# Patient Record
Sex: MALE | Race: WHITE | NOT HISPANIC OR LATINO | Employment: STUDENT | URBAN - METROPOLITAN AREA
[De-identification: names, ages, dates, MRNs, and addresses within clinical notes are randomized per-mention and may not be internally consistent; named-entity substitution may affect disease eponyms.]

---

## 2022-03-05 ENCOUNTER — HOSPITAL ENCOUNTER (EMERGENCY)
Facility: HOSPITAL | Age: 22
Discharge: HOME | End: 2022-03-05
Attending: EMERGENCY MEDICINE | Admitting: EMERGENCY MEDICINE

## 2022-03-05 VITALS
TEMPERATURE: 98.2 F | OXYGEN SATURATION: 97 % | HEART RATE: 88 BPM | DIASTOLIC BLOOD PRESSURE: 78 MMHG | SYSTOLIC BLOOD PRESSURE: 120 MMHG | RESPIRATION RATE: 18 BRPM

## 2022-03-05 DIAGNOSIS — S61.012A THUMB LACERATION, LEFT, INITIAL ENCOUNTER: Primary | ICD-10-CM

## 2022-03-05 PROCEDURE — 99282 EMERGENCY DEPT VISIT SF MDM: CPT | Performed by: EMERGENCY MEDICINE

## 2022-03-05 PROCEDURE — 99282 EMERGENCY DEPT VISIT SF MDM: CPT

## 2022-03-05 PROCEDURE — 12002 RPR S/N/AX/GEN/TRNK2.6-7.5CM: CPT | Performed by: EMERGENCY MEDICINE

## 2022-03-05 RX ORDER — LIDOCAINE HYDROCHLORIDE 10 MG/ML
10 INJECTION, SOLUTION EPIDURAL; INFILTRATION; INTRACAUDAL; PERINEURAL ONCE
Status: COMPLETED | OUTPATIENT
Start: 2022-03-05 | End: 2022-03-05

## 2022-03-05 RX ORDER — ROPIVACAINE HYDROCHLORIDE 5 MG/ML
15 INJECTION, SOLUTION EPIDURAL; INFILTRATION; PERINEURAL ONCE
Status: COMPLETED | OUTPATIENT
Start: 2022-03-05 | End: 2022-03-05

## 2022-03-05 RX ORDER — LIDOCAINE HYDROCHLORIDE 10 MG/ML
10 INJECTION, SOLUTION EPIDURAL; INFILTRATION; INTRACAUDAL; PERINEURAL ONCE
Status: DISCONTINUED | OUTPATIENT
Start: 2022-03-05 | End: 2022-03-05

## 2022-03-05 RX ADMIN — LIDOCAINE HYDROCHLORIDE 10 ML: 10 INJECTION, SOLUTION EPIDURAL; INFILTRATION; INTRACAUDAL; PERINEURAL at 03:56

## 2022-03-05 RX ADMIN — ROPIVACAINE HYDROCHLORIDE 15 ML: 5 INJECTION, SOLUTION EPIDURAL; INFILTRATION; PERINEURAL at 03:56

## 2022-03-05 NOTE — ED PROVIDER NOTES
History  Chief Complaint   Patient presents with    Finger Laceration     Picked up glass about two hours ago and cut his left thumb on the glass  Deepika Akers is a 25year-old young man presenting with a left thumb laceration  Cut himself trying to clean up glass  No foreign body sensation  No numbness or weakness in left thumb  He is left hand dominant  No other injuries from tonight, no other complaints at this time  Tetanus up to date  He had several alcoholic beverages tonight  None       History reviewed  No pertinent past medical history  Past Surgical History:   Procedure Laterality Date    MASTECTOMY FOR GYNECOMASTIA         History reviewed  No pertinent family history  I have reviewed and agree with the history as documented  E-Cigarette/Vaping     E-Cigarette/Vaping Substances     Social History     Tobacco Use    Smoking status: Never Smoker    Smokeless tobacco: Never Used   Substance Use Topics    Alcohol use: Yes     Alcohol/week: 9 0 standard drinks     Types: 9 Shots of liquor per week    Drug use: Not on file        Review of Systems   All other systems reviewed and are negative  Physical Exam  ED Triage Vitals   Temp Pulse Resp BP SpO2   -- -- -- -- --      Temp src Heart Rate Source Patient Position - Orthostatic VS BP Location FiO2 (%)   -- -- -- -- --      Pain Score       --             Orthostatic Vital Signs  There were no vitals filed for this visit  Physical Exam  Vitals and nursing note reviewed  Constitutional:       General: He is not in acute distress  Appearance: He is not ill-appearing  Comments: Appears intoxicated  HENT:      Head: Normocephalic and atraumatic  Right Ear: External ear normal       Left Ear: External ear normal       Nose: Nose normal    Eyes:      Conjunctiva/sclera: Conjunctivae normal    Cardiovascular:      Rate and Rhythm: Normal rate and regular rhythm     Pulmonary:      Effort: Pulmonary effort is normal    Abdominal:      General: There is no distension  Musculoskeletal:      Comments: ROM left thumb normal     Skin:     General: Skin is warm and dry  Comments: 3 cm laceration to palmar surface of left thumb  No gross contamination  Neurological:      General: No focal deficit present  Mental Status: He is alert  ED Medications  Medications   lidocaine (PF) (XYLOCAINE-MPF) 1 % injection 10 mL (10 mL Infiltration Given by Other 3/5/22 0356)   ropivacaine (NAROPIN) 0 5 % injection 15 mL (15 mL Infiltration Given by Other 3/5/22 0356)       Diagnostic Studies  Results Reviewed     None                 No orders to display         Procedures  Laceration repair    Date/Time: 3/5/2022 5:20 AM  Performed by: Armando Montano MD  Authorized by: Armando Montano MD   Consent: Verbal consent obtained  Risks and benefits: risks, benefits and alternatives were discussed  Consent given by: patient  Patient understanding: patient states understanding of the procedure being performed  Patient consent: the patient's understanding of the procedure matches consent given  Procedure consent: procedure consent matches procedure scheduled  Relevant documents: relevant documents present and verified  Test results: test results available and properly labeled  Site marked: the operative site was marked  Radiology Images displayed and confirmed  If images not available, report reviewed: imaging studies available  Required items: required blood products, implants, devices, and special equipment available  Patient identity confirmed: verbally with patient  Body area: upper extremity  Location details: left thumb  Laceration length: 3 cm  Foreign bodies: no foreign bodies  Tendon involvement: none  Nerve involvement: none  Anesthesia: local infiltration    Anesthesia:  Local anesthetic: 0 5% ropivicaine     Anesthetic total: 8 mL    Sedation:  Patient sedated: no      Wound Dehiscence:  Superficial Wound Dehiscence: simple closure      Procedure Details:  Irrigation solution: tap water  Irrigation method: tap  Amount of cleaning: extensive  Debridement: none  Degree of undermining: none  Skin closure: 6-0 nylon  Number of sutures: 6  Technique: simple  Approximation: close  Approximation difficulty: simple            ED Course                                       [facility]  Number of Diagnoses or Management Options  Thumb laceration, left, initial encounter  Diagnosis management comments: 24 yo man presenting with thumb laceration  Repaired at bedside  Patient is still intoxicated and unable to repeat follow up instructions  Will observe in the ED until clinically sober and able to repeat back follow up instructions  Patient to have wound check in 2 days given this is his dominant hand  Sutures to be removed in 7 days  Discharge pending at sign out         Disposition  Final diagnoses:   Thumb laceration, left, initial encounter     Time reflects when diagnosis was documented in both MDM as applicable and the Disposition within this note     Time User Action Codes Description Comment    3/5/2022  3:43 AM Luis Daniel Bhagat Add [S61 [id number]] Thumb laceration, left, initial encounter       ED Disposition     None      Follow-up Information     Follow up With Specialties Details Why Contact Info Additional [address] Emergency Department Emergency Medicine On 3/7/2022 For wound re-check [address] [place] [address] Emergency Department, [address], [place] [address]    [address] [place] [address] Emergency Department Emergency Medicine In 1 week For suture removal [address] [place] [address] Emergency Department, [address] [place] [address], [address]   [phone]          Patient's Medications    No medications on file     No discharge procedures on file     PDMP Review     None           ED Provider  Attending physically available and evaluated Joy Robertson I managed the patient along with the ED Attending      Electronically Signed by         Danilo Li MD  03/05/22 0559

## 2022-03-05 NOTE — ED NOTES
Provider at bedside     James Osuna, [address] [facility]  03/05/22 1790

## 2022-03-05 NOTE — DISCHARGE INSTRUCTIONS
Do not submerge your hand in water until the stitches are removed, including swimming and taking a bath  Wear gloves when washing dishes  Keep the wound clean and dry  Change the dressing daily  Keep your thumb elevated to help reduce the swelling  Please have the wound checked by a medical professional in two days! ! The stitches should be removed in 7 days  This can be done at the school clinic, the ER, or an Urgent Care  You should stop drinking! If you notice any signs of infection like redness, pus draining, or increasing pain, please return to your nearest ER

## 2022-03-07 NOTE — ED ATTENDING ATTESTATION
3/5/2022  INoelle MD, saw and evaluated the patient  I have discussed the patient with the resident/non-physician practitioner and agree with the resident's/non-physician practitioner's findings, Plan of Care, and MDM as documented in the resident's/non-physician practitioner's note, except where noted  All available labs and Radiology studies were reviewed  I was present for key portions of any procedure(s) performed by the resident/non-physician practitioner and I was immediately available to provide assistance  At this point I agree with the current assessment done in the Emergency Department  I have conducted an independent evaluation of this patient a history and physical is as follows:    ED Course     19-year-old male with left thumb laceration cut on glass  Patient has intact sensory motor perfusion to area  There is a 3 centimeter laceration to the palmar surface of left thumb  The bleeding is  controlled at this time  Impression:  Left thumb laceration  Plan to block wound washout perform primary closure with sutures  Patient will be observed clinically sober in the emergency department as patient does admit to alcohol use tonight does appear clinically intoxicated at this time    Critical Care Time  Procedures